# Patient Record
Sex: MALE | Race: WHITE | NOT HISPANIC OR LATINO | Employment: OTHER | URBAN - NONMETROPOLITAN AREA
[De-identification: names, ages, dates, MRNs, and addresses within clinical notes are randomized per-mention and may not be internally consistent; named-entity substitution may affect disease eponyms.]

---

## 2017-03-14 ENCOUNTER — OFFICE VISIT (OUTPATIENT)
Dept: OTOLARYNGOLOGY | Facility: CLINIC | Age: 32
End: 2017-03-14

## 2017-03-14 VITALS
DIASTOLIC BLOOD PRESSURE: 70 MMHG | WEIGHT: 214 LBS | SYSTOLIC BLOOD PRESSURE: 130 MMHG | BODY MASS INDEX: 28.99 KG/M2 | HEIGHT: 72 IN

## 2017-03-14 DIAGNOSIS — R04.0 NASAL BLEEDING: Primary | ICD-10-CM

## 2017-03-14 DIAGNOSIS — J34.2 DEVIATED NASAL SEPTUM: ICD-10-CM

## 2017-03-14 PROCEDURE — 99203 OFFICE O/P NEW LOW 30 MIN: CPT | Performed by: OTOLARYNGOLOGY

## 2017-03-14 RX ORDER — ESCITALOPRAM OXALATE 10 MG/1
10 TABLET ORAL DAILY
COMMUNITY
End: 2017-09-07

## 2017-03-14 NOTE — PROGRESS NOTES
Cierra Montanez is a 31 y.o. male.       History of Present Illness   Patient is here for evaluation of nosebleeds.  These began about 2 months ago.  Would occur during the night.  Sometimes last up to 2 hours.  Were usually from the right side of the nose.  Nothing in particular seems to bring these on.  No previous nasal surgery.  Is not anticoagulated.  No family history of bleeding disorder.  Was seen elsewhere and given mupirocin ointment and this seems to have helped.  Has not had a nosebleed in several weeks.      The following portions of the patient's history were reviewed and updated as appropriate: allergies, current medications, past family history, past medical history, past social history, past surgical history and problem list.     reports that he has never smoked. He does not have any smokeless tobacco history on file.   Patient is not a tobacco user and has not been counseled for use of tobacco products      Review of Systems   Constitutional: Negative for fever.   HENT: Positive for nosebleeds.    All other systems reviewed and are negative.          Objective   Physical Exam  General: Well-developed well-nourished male in no acute distress.  Appears alert.  Head normocephalic.  Ears: External ears no deformity, canals no discharge, tympanic membranes intact clear and mobile bilaterally.  Nose: Nares show no discharge mass polyp or purulence.  Boggy mucosa is present.  No gross external deformity.  Septum: To the right with prominent vessels on the anterior septum on the right but these do not appear to have recently bled  Oral cavity: Lips and gums without lesions.  Tongue and floor of mouth without lesions.  Parotid and submandibular ducts unobstructed.  No mucosal lesions on the buccal mucosa or vestibule of the mouth.  Pharynx: No erythema exudate mass or blood  Neck: No lymphadenopathy.  No thyromegaly.  Trachea and larynx midline.  No masses in the parotid or submandibular  glands.      Assessment/Plan   Stone was seen today for nose bleed.    Diagnoses and all orders for this visit:    Nasal bleeding    Deviated nasal septum      Plan: Since the patient has not bled in several weeks I don't think cauterization is indicated at this point.  I have advised continued use of the mupirocin ointment and at the request a prescription is sent to their preferred pharmacy with refills.  Advise using nasal saline spray at least 3-4 times a day along with a cool mist vaporizer at bedside and avoiding digital trauma.  Instructed to call if nasal bleeding recurs and I will try to see him within a short period of time and see if there is a vessel that would warrant cautery.

## 2017-09-07 PROBLEM — F91.3 OPPOSITIONAL DEFIANT DISORDER: Status: ACTIVE | Noted: 2017-02-14

## 2020-09-15 ENCOUNTER — OFFICE VISIT (OUTPATIENT)
Dept: OTOLARYNGOLOGY | Facility: CLINIC | Age: 35
End: 2020-09-15

## 2020-09-15 VITALS — WEIGHT: 226.4 LBS | HEIGHT: 72 IN | OXYGEN SATURATION: 96 % | BODY MASS INDEX: 30.66 KG/M2 | TEMPERATURE: 97.9 F

## 2020-09-15 DIAGNOSIS — R26.89 IMBALANCE: Primary | ICD-10-CM

## 2020-09-15 DIAGNOSIS — H61.22 IMPACTED CERUMEN OF LEFT EAR: ICD-10-CM

## 2020-09-15 PROCEDURE — 69210 REMOVE IMPACTED EAR WAX UNI: CPT | Performed by: OTOLARYNGOLOGY

## 2020-09-15 PROCEDURE — 99203 OFFICE O/P NEW LOW 30 MIN: CPT | Performed by: OTOLARYNGOLOGY

## 2020-09-15 RX ORDER — LAMOTRIGINE 100 MG/1
100 TABLET ORAL EVERY MORNING
COMMUNITY
Start: 2020-09-04

## 2020-09-15 RX ORDER — SERTRALINE HYDROCHLORIDE 100 MG/1
100 TABLET, FILM COATED ORAL EVERY MORNING
COMMUNITY
Start: 2020-08-28

## 2020-09-15 RX ORDER — CYANOCOBALAMIN (VITAMIN B-12) 1000 MCG
TABLET ORAL
COMMUNITY
Start: 2020-06-12

## 2020-09-15 RX ORDER — DIAZEPAM 5 MG/1
TABLET ORAL
COMMUNITY
Start: 2020-09-10 | End: 2020-09-19

## 2020-09-15 RX ORDER — FAMOTIDINE 40 MG/1
40 TABLET, FILM COATED ORAL DAILY
COMMUNITY

## 2020-09-15 RX ORDER — TRAZODONE HYDROCHLORIDE 100 MG/1
50 TABLET ORAL
COMMUNITY
Start: 2020-09-11

## 2020-09-15 RX ORDER — OLANZAPINE 10 MG/1
10 TABLET ORAL NIGHTLY
COMMUNITY

## 2020-09-15 RX ORDER — TRAZODONE HYDROCHLORIDE 150 MG/1
150 TABLET ORAL
COMMUNITY
Start: 2020-09-11

## 2020-09-20 NOTE — PROGRESS NOTES
Subjective   Stone Montanez is a 34 y.o. male.     History of Present Illness   Patient is here for evaluation of possible ear problems.  Much of the history is obtained from his father.  He reportedly has been falling and losing his balance quite a bit lately.  Apparently had an MRI scan that the father thinks was abnormal but has not yet been followed up upon.  Is not having any otorrhea.  Did not have ear surgery as a child as far as the father can remember.  Patient is on multiple psychoactive medications.      The following portions of the patient's history were reviewed and updated as appropriate: allergies, current medications, past family history, past medical history, past social history, past surgical history and problem list.      Stone Montanez reports that he has never smoked. He has never used smokeless tobacco.  Patient is not a tobacco user and has not been counseled for use of tobacco products    No family history on file.       No Known Allergies      Current Outpatient Medications:   •  Cyanocobalamin (B-12) 1000 MCG tablet, DISSOLVE 1 TABLET UNDER TONGUE DAILY, Disp: , Rfl:   •  divalproex (DEPAKOTE) 500 MG 24 hr tablet, Take 500 mg by mouth Daily., Disp: , Rfl:   •  famotidine (PEPCID) 40 MG tablet, Take 40 mg by mouth Daily., Disp: , Rfl:   •  folic acid (FOLVITE) 1 MG tablet, Take 1 mg by mouth Daily., Disp: , Rfl:   •  lamoTRIgine (LaMICtal) 100 MG tablet, Take 100 mg by mouth Every Morning., Disp: , Rfl:   •  OLANZapine (zyPREXA) 10 MG tablet, Take 10 mg by mouth Every Night., Disp: , Rfl:   •  OLANZapine (zyPREXA) 20 MG tablet, Take 20 mg by mouth Every Night., Disp: , Rfl:   •  sertraline (ZOLOFT) 100 MG tablet, Take 100 mg by mouth Every Morning., Disp: , Rfl:   •  traZODone (DESYREL) 150 MG tablet, Take 150 mg by mouth every night at bedtime., Disp: , Rfl:   •  traZODone (DESYREL) 100 MG tablet, Take 50 mg by mouth every night at bedtime., Disp: , Rfl:     Past Medical History:    Diagnosis Date   • Acute pain    • Ankle joint pain    • Cellulitis    • Cracked skin on feet    • Dysuria    • Epistaxis     currently resolved   • Sprain of ankle     Sprain, ankle joint, lateral          No past surgical history on file.      Review of Systems   Unable to perform ROS: Psychiatric disorder   Psychiatric/Behavioral:        Not assessed           Objective   Physical Exam  General: Well-developed well-nourished male in no acute distress.  Flat affect, nonconversant. Head: Normocephalic. Face: Symmetrical strength and appearance. PERRL. EOMI. Voice: No stridor or stertor. Speech: Does not converse during the  Ears: External ears no deformity, right ear canal shows no discharge.  Beyond this tympanic membrane is intact with tympanosclerosis, no infection or effusion.  Left ear canal shows a cerumen impaction.  Using the binocular microscope for visualization, cerumen impaction was removed from left ear canal(s) using instrumentation. This was personally performed by Brian Villafuerte MD following cerumen removal tympanic membrane is noted to be intact with no evidence of infection or effusion.  Nose: Nares show no discharge mass polyp or purulence.  Boggy mucosa is present.  No gross external deformity.  Septum: To the right  Oral cavity: Lips and gums without lesions.  Tongue and floor of mouth without lesions.  Parotid and submandibular ducts unobstructed.  No mucosal lesions on the buccal mucosa or vestibule of the mouth.  Pharynx: No erythema exudate mass or ulcer  Neck: No lymphadenopathy.  No thyromegaly.  Trachea and larynx midline.  No masses in the parotid or submandibular glands.        Assessment/Plan   Stone was seen today for ear problem.    Diagnoses and all orders for this visit:    Imbalance    Impacted cerumen of left ear        Plan: Cerumen removed as described above.  Reassurance to the patient's father that I see no evidence of acute otologic pathology.  He should  definitely follow-up with Dr. Ho regarding the possibly abnormal MRI.  He is also on numerous psychoactive medicines which could cause imbalance their own.  Follow-up with me as needed.

## 2021-03-26 ENCOUNTER — IMMUNIZATION (OUTPATIENT)
Dept: VACCINE CLINIC | Facility: HOSPITAL | Age: 36
End: 2021-03-26

## 2021-03-26 PROCEDURE — 91300 HC SARSCOV02 VAC 30MCG/0.3ML IM: CPT | Performed by: NURSE PRACTITIONER

## 2021-03-26 PROCEDURE — 0001A: CPT | Performed by: NURSE PRACTITIONER

## 2021-04-16 ENCOUNTER — IMMUNIZATION (OUTPATIENT)
Dept: VACCINE CLINIC | Facility: HOSPITAL | Age: 36
End: 2021-04-16

## 2021-04-16 PROCEDURE — 0002A: CPT | Performed by: THORACIC SURGERY (CARDIOTHORACIC VASCULAR SURGERY)

## 2021-04-16 PROCEDURE — 91300 HC SARSCOV02 VAC 30MCG/0.3ML IM: CPT | Performed by: THORACIC SURGERY (CARDIOTHORACIC VASCULAR SURGERY)
